# Patient Record
Sex: FEMALE | Race: WHITE | Employment: OTHER | URBAN - METROPOLITAN AREA
[De-identification: names, ages, dates, MRNs, and addresses within clinical notes are randomized per-mention and may not be internally consistent; named-entity substitution may affect disease eponyms.]

---

## 2021-07-27 ENCOUNTER — HOSPITAL ENCOUNTER (OUTPATIENT)
Dept: SPEECH THERAPY | Facility: CLINIC | Age: 81
Setting detail: THERAPIES SERIES
End: 2021-07-27
Attending: INTERNAL MEDICINE
Payer: COMMERCIAL

## 2021-07-27 ENCOUNTER — HOSPITAL ENCOUNTER (OUTPATIENT)
Dept: GENERAL RADIOLOGY | Facility: CLINIC | Age: 81
Discharge: HOME | End: 2021-07-27
Attending: INTERNAL MEDICINE | Admitting: INTERNAL MEDICINE
Payer: COMMERCIAL

## 2021-07-27 DIAGNOSIS — R13.10 DYSPHAGIA, UNSPECIFIED TYPE: ICD-10-CM

## 2021-07-27 PROCEDURE — 255N000001 HC RX 255: Performed by: RADIOLOGY

## 2021-07-27 PROCEDURE — 74230 X-RAY XM SWLNG FUNCJ C+: CPT

## 2021-07-27 PROCEDURE — 92611 MOTION FLUOROSCOPY/SWALLOW: CPT | Mod: GN

## 2021-07-27 PROCEDURE — 74220 X-RAY XM ESOPHAGUS 1CNTRST: CPT

## 2021-07-27 RX ORDER — BARIUM SULFATE 400 MG/ML
SUSPENSION ORAL ONCE
Status: DISCONTINUED | OUTPATIENT
Start: 2021-07-27 | End: 2021-07-27

## 2021-07-27 RX ADMIN — ANTACID/ANTIFLATULENT 4 G: 380; 550; 10; 10 GRANULE, EFFERVESCENT ORAL at 10:41

## 2021-07-27 NOTE — PROGRESS NOTES
Video Fluoroscopic Swallow Study (VFSS)     07/27/21 1051   General Information   Type Of Visit Initial   Start Of Care Date 07/27/21   Referring Physician Paulo Jacobson MD   Medical Diagnosis dysphagia, unspecified (R13.10)   Onset Of Illness/injury Or Date Of Surgery 06/22/21  (date evaluation was ordered)   Pertinent History of Current Problem/OT: Additional Occupational Profile Info Pt referred for video fluoroscopic swallow study by GI MD given the following dysphagia symptoms: intertmittent globus sensation at level of sternal notch which occurs typically with breads/meats - when they stick she either pushes it out of the way/down or has to vomit it back up. This has been occuring for several months. GI provider also ordered an esophagram and recommended softer diet until these assessments. Pt reports softer diet has assisted in reducing dysphagia symptoms. She denies any issues with liquids. She does report PNA's atleast once every year.    Abuse Screen (yes response referral indicated)   Feels Unsafe at Home or Work/School no   Feels Threatened by Someone no   Does Anyone Try to Keep You From Having Contact with Others or Doing Things Outside Your Home? no   Physical Signs of Abuse Present no   VFSS Evaluation   VFSS Additional Documentation Yes   VFSS Eval: Radiology   Radiologist Dr. Osuna   Views Taken left lateral   Physical Location of Procedure [facility], Radiology Dept, Fluoroscopy Suite   VFSS Eval: Thin Liquid Texture Trial   Mode of Presentation, Thin Liquid cup;straw;self-fed   Order of Presentation 1, 2, 3, 8   Preparatory Phase WFL   Oral Phase, Thin Liquid WFL   Pharyngeal Phase, Thin Liquid WFL   Rosenbek's Penetration Aspiration Scale: Thin Liquid Trial Results 1 - no aspiration, contrast does not enter airway   VFSS Eval: Puree Solid Texture Trial   Mode of Presentation, Puree spoon;self-fed   Order of Presentation 4   Preparatory  Phase WFL   Oral Phase, Puree WFL   Pharyngeal Phase, Puree WFL   Rosenbek's Penetration Aspiration Scale: Puree Food Trial Results 1 - no aspiration, contrast does not enter airway   VFSS Eval: Regular Texture Trial (Solid)   Mode of Presentation self-fed   Order of Presentation 5, 6, 7   Preparatory Phase WFL   Oral Phase WFL   Pharyngeal Phase WFL   Rosenbek's Penetration Aspiration Scale 1 - no aspiration, contrast does not enter airway   Esophageal Phase of Swallow   Patient reports or presents with symptoms of esophageal dysphagia Yes   Esophageal comments esophagram also completed -see radiologist's note   Swallow Eval: Clinical Impressions   Skilled Criteria for Therapy Intervention No problems identified which require skilled intervention   Functional Assessment Scale (FAS) 6   Dysphagia Outcome Severity Scale (ANGELICA) Level 6 - ANGELICA   Treatment Diagnosis functional oropharyngeal swallow   Diet texture recommendations Regular diet;Thin liquids (level 0)   Demonstrates Need for Referral to Another Service   (back to GI)   Clinical Impression Comments Pt presents with a functional oropharyngeal swallow on video fluoroscopic swallow study. Good oral control/manipulation, timely pharyngeal swallow initiation, base of tongue retraction WFL, hyolaryngeal elevation/excursion WFL, epiglottic inversion complete. Prominent cricopharyngeus noted, though it did not impede bolus flow or cause any bolus retention during today's assessment. See radiologist's report re: esophagram resuts.     Recommendations: from an oropharyngeal standpoint, OK for regular textures, though given esophagram findings/ pt reports more ease with softer foods PTA, I did encourage her to continue with same. Defer to GI for further follow up.   Total Session Time   SLP Eval: VideoFluoroscopic Swallow function Minutes (04144) 16   Total Evaluation Time 16

## 2023-07-21 ENCOUNTER — HOSPITAL ENCOUNTER (OUTPATIENT)
Facility: CLINIC | Age: 83
End: 2023-07-21
Attending: INTERNAL MEDICINE | Admitting: INTERNAL MEDICINE
Payer: COMMERCIAL